# Patient Record
Sex: MALE | Race: BLACK OR AFRICAN AMERICAN | NOT HISPANIC OR LATINO | Employment: UNEMPLOYED | URBAN - METROPOLITAN AREA
[De-identification: names, ages, dates, MRNs, and addresses within clinical notes are randomized per-mention and may not be internally consistent; named-entity substitution may affect disease eponyms.]

---

## 2017-04-22 ENCOUNTER — HOSPITAL ENCOUNTER (EMERGENCY)
Facility: HOSPITAL | Age: 57
Discharge: HOME | End: 2017-04-22
Attending: EMERGENCY MEDICINE

## 2017-04-22 VITALS
TEMPERATURE: 98 F | BODY MASS INDEX: 25.84 KG/M2 | HEIGHT: 73 IN | OXYGEN SATURATION: 100 % | WEIGHT: 195 LBS | RESPIRATION RATE: 15 BRPM | HEART RATE: 76 BPM | DIASTOLIC BLOOD PRESSURE: 90 MMHG | SYSTOLIC BLOOD PRESSURE: 162 MMHG

## 2017-04-22 DIAGNOSIS — R07.89 ATYPICAL CHEST PAIN: Primary | ICD-10-CM

## 2017-04-22 DIAGNOSIS — I10 ESSENTIAL HYPERTENSION: ICD-10-CM

## 2017-04-22 PROCEDURE — 96374 THER/PROPH/DIAG INJ IV PUSH: CPT

## 2017-04-22 PROCEDURE — 93010 ELECTROCARDIOGRAM REPORT: CPT | Mod: ,,, | Performed by: INTERNAL MEDICINE

## 2017-04-22 PROCEDURE — 25000003 PHARM REV CODE 250

## 2017-04-22 PROCEDURE — 80053 COMPREHEN METABOLIC PANEL: CPT

## 2017-04-22 PROCEDURE — 84484 ASSAY OF TROPONIN QUANT: CPT

## 2017-04-22 PROCEDURE — 63600175 PHARM REV CODE 636 W HCPCS: Performed by: EMERGENCY MEDICINE

## 2017-04-22 PROCEDURE — 93005 ELECTROCARDIOGRAM TRACING: CPT

## 2017-04-22 PROCEDURE — 85025 COMPLETE CBC W/AUTO DIFF WBC: CPT

## 2017-04-22 PROCEDURE — 99284 EMERGENCY DEPT VISIT MOD MDM: CPT | Mod: 25

## 2017-04-22 PROCEDURE — 83735 ASSAY OF MAGNESIUM: CPT

## 2017-04-22 RX ORDER — HYDRALAZINE HYDROCHLORIDE 20 MG/ML
10 INJECTION INTRAMUSCULAR; INTRAVENOUS ONCE
Status: COMPLETED | OUTPATIENT
Start: 2017-04-22 | End: 2017-04-22

## 2017-04-22 RX ORDER — ASPIRIN 325 MG
TABLET ORAL
Status: COMPLETED
Start: 2017-04-22 | End: 2017-04-22

## 2017-04-22 RX ORDER — ASPIRIN 325 MG
325 TABLET ORAL
Status: COMPLETED | OUTPATIENT
Start: 2017-04-22 | End: 2017-04-22

## 2017-04-22 RX ORDER — HYDRALAZINE HYDROCHLORIDE 20 MG/ML
INJECTION INTRAMUSCULAR; INTRAVENOUS
Status: DISCONTINUED
Start: 2017-04-22 | End: 2017-04-22 | Stop reason: HOSPADM

## 2017-04-22 RX ADMIN — Medication: at 05:04

## 2017-04-22 RX ADMIN — ASPIRIN 325 MG ORAL TABLET: 325 PILL ORAL at 05:04

## 2017-04-22 RX ADMIN — HYDRALAZINE HYDROCHLORIDE 10 MG: 20 INJECTION INTRAMUSCULAR; INTRAVENOUS at 05:04

## 2017-04-22 NOTE — ED AVS SNAPSHOT
[facility]  [address]  [place] [address]               Devin Farley   2017  4:59 AM   ED    Description:  Male : 1960   Department:  [facility]           Your Care was Coordinated By:     Provider Role From To    Nata Flannery MD Attending Provider 17 0511 --      Diagnoses this Visit        Comments    Atypical chest pain    -  Primary     Essential hypertension           ED Disposition     None           To Do List           Follow-up Information     Follow up with Sravan Porter MD In 2 days.    Specialty:  Cardiology    Contact information:    [address] [facility] [address] [place] [address]  [phone]          Follow up with Johanna Nance MD In 2 days.    Specialty:  Cardiology    Contact information:    [address]  [place] [address]  [phone]        [facility]     [facility] Care Line - 24/ Assistance  Unless otherwise directed by your provider, please contact [facility], our nurse care line that is available for / assistance.     Registered nurses in the [facility] provide: appointment scheduling, clinical advisement, health education, and other advisory services.  Call: [phone] (toll free)               Medications           These medications were administered today        Dose Freq    hydrALAZINE (APRESOLINE) 20 mg/mL injection      Notes to Pharmacy: Created by cabinet override    aspirin 325 MG tablet      Notes to Pharmacy: Created by cabinet override    hydrALAZINE injection 10 mg 10 mg Once    Sig: Inject 0.5 mLs (10 mg total) into the vein once.    Class: Normal    Route: Intravenous    aspirin tablet 325 mg 325 mg ED 1 Time    Sig: Take 1 tablet (325 mg total) by mouth ED 1 Time.    Class: Normal    Route: Oral           Verify that the below list of medications is an accurate representation of the medications you are currently taking.  If none  "reported, the list may be blank. If incorrect, please contact your healthcare provider. Carry this list with you in case of emergency.           Current Medications            Clinical Reference Information           Your Vitals Were     BP Pulse Temp Resp Height Weight    155/82 75 98.4 °F (36.9 °C) (Oral) 16 6' 1" (1.854 m) 88.5 kg (195 lb)    SpO2 BMI             100% 25.73 kg/m2         Allergies as of 4/22/2017     No Known Allergies      Immunizations Administered on Date of Encounter - 4/22/2017     None      ED Micro, Lab, POCT     Start Ordered       Status Ordering Provider    04/22/17 0530 04/22/17 0529    STAT,   Status:  Canceled      Canceled     04/22/17 0530 04/22/17 0529    STAT,   Status:  Canceled      Canceled     04/22/17 0530 04/22/17 0529  Troponin I  Now then every 3 hours     Comments:  PLEASE REVIEW ORDER START TIME BEFORE MARKING SPECIMEN COLLECTED.   Start Status   04/22/17 0530 Canceled   04/22/17 0830 Acknowledged       Acknowledged       ED Imaging Orders     Start Ordered       Status Ordering Provider    04/22/17 0512 04/22/17 0511  X-Ray Chest 1 View  1 time imaging      Final result         Discharge Instructions         1. Return immediately for recurrent symptoms or if you decide you want the additional blood work or to be admitted  2. Take an enteric coated aspirin 81 mg every day with food  3. Followup with a cardiologist on Monday     Uncertain Causes of Chest Pain    Chest pain can happen for a number of reasons. Sometimes the cause can't be determined. If your condition does not seem serious, and your pain does not appear to be coming from your heart, your healthcare provider may recommend watching it closely. Sometimes the signs of a serious problem take more time to appear. Many problems not related to your heart can cause chest pain.These include:  · Musculoskeletal. Costochondritis, an inflammation of the tissues around the ribs that can occur from trauma or overuse " injuries  · Respiratory. Pneumonia, pneumothorax, or pneumonitis (inflammation of the lining of the chest and lungs)  · Gastrointestinal. Esophageal reflux, heartburn, or gallbladder disease  · Anxiety and panic disorders  · Nerve compression and neuritis  · Miscellaneous problems such as aortic aneurysm or pulmonary embolism (a blood clot in the lungs)  Home care  After your visit, follow these recommendations:  · Rest today and avoid strenuous activity.  · Take any prescribed medicine as directed.  · Be aware of any recurrent chest pain and notice any changes  Follow-up care  Follow up with your healthcare provider if you do not start to feel better within 24 hours, or as advised.  Call 911  Call 911 if any of these occur:  · A change in the type of pain: if it feels different, becomes more severe, lasts longer, or begins to spread into your shoulder, arm, neck, jaw or back  · Shortness of breath or increased pain with breathing  · Weakness, dizziness, or fainting  · Rapid heart beat  · Crushing sensation in your chest  When to seek medical advice  Call your healthcare provider right away if any of the following occur:  · Cough with dark colored sputum (phlegm) or blood  · Fever of 100.4ºF (38ºC) or higher, or as directed by your healthcare provider  · Swelling, pain or redness in one leg  · Shortness of breath  Date Last Reviewed: 12/30/2015  © 2882-7591 [employer] [address] [place] [address]. All rights reserved. This information is not intended as a substitute for professional medical care. Always follow your healthcare professional's instructions.          [facility] Sign-Up     Activating your [facility] account is as easy as 1-2-3!     1) Visit [email or web address], select Sign Up Now, enter this activation code and your date of birth, then select Next.  [email or web address]  Expires: 6/6/2017  8:22 AM      2) Create a username and password to use when you visit [facility] in the future and  select a security question in case you lose your password and select Next.    3) Enter your e-mail address and click Sign Up!    Additional Information  If you have questions, please e-mail [email or web address] or call [phone] to talk to our [facility] staff. Remember, [facility] is NOT to be used for urgent needs. For medical emergencies, dial 911.         Smoking Cessation     If you would like to quit smoking:   You may be eligible for free services if you are a [place] resident and started smoking cigarettes before September 1, 1988.  Call the Smoking Cessation Trust (SCT) toll free at (391) 482-1546 or (340) 448-9271.   Call 8-693-QUIT-NOW if you do not meet the above criteria.   Contact us via email: [email or web address]   View our website for more information: [email or web address]         [facility] complies with applicable Federal civil rights laws and does not discriminate on the basis of race, color, national origin, age, disability, or sex.        Language Assistance Services     ATTENTION: Language assistance services are available, free of charge. Please call [phone].      ATENCIÓN: Si habla español, tiene a panchal disposición servicios gratuitos de asistencia lingüística. Llame al [phone].     CHÚ Ý: N?u b?n nói Ti?ng Vi?t, có các d?ch v? h? tr? ngôn ng? mi?n phí dành cho b?n. G?i s? [phone].

## 2017-04-22 NOTE — DISCHARGE INSTRUCTIONS
1. Return immediately for recurrent symptoms or if you decide you want the additional blood work or to be admitted  2. Take an enteric coated aspirin 81 mg every day with food  3. Followup with a cardiologist on Monday     Uncertain Causes of Chest Pain    Chest pain can happen for a number of reasons. Sometimes the cause can't be determined. If your condition does not seem serious, and your pain does not appear to be coming from your heart, your healthcare provider may recommend watching it closely. Sometimes the signs of a serious problem take more time to appear. Many problems not related to your heart can cause chest pain.These include:  · Musculoskeletal. Costochondritis, an inflammation of the tissues around the ribs that can occur from trauma or overuse injuries  · Respiratory. Pneumonia, pneumothorax, or pneumonitis (inflammation of the lining of the chest and lungs)  · Gastrointestinal. Esophageal reflux, heartburn, or gallbladder disease  · Anxiety and panic disorders  · Nerve compression and neuritis  · Miscellaneous problems such as aortic aneurysm or pulmonary embolism (a blood clot in the lungs)  Home care  After your visit, follow these recommendations:  · Rest today and avoid strenuous activity.  · Take any prescribed medicine as directed.  · Be aware of any recurrent chest pain and notice any changes  Follow-up care  Follow up with your healthcare provider if you do not start to feel better within 24 hours, or as advised.  Call 911  Call 911 if any of these occur:  · A change in the type of pain: if it feels different, becomes more severe, lasts longer, or begins to spread into your shoulder, arm, neck, jaw or back  · Shortness of breath or increased pain with breathing  · Weakness, dizziness, or fainting  · Rapid heart beat  · Crushing sensation in your chest  When to seek medical advice  Call your healthcare provider right away if any of the following occur:  · Cough with dark colored sputum  (phlegm) or blood  · Fever of 100.4ºF (38ºC) or higher, or as directed by your healthcare provider  · Swelling, pain or redness in one leg  · Shortness of breath  Date Last Reviewed: 12/30/2015  © 6157-4525 [employer]. [address] [place] [address]. All rights reserved. This information is not intended as a substitute for professional medical care. Always follow your healthcare professional's instructions.

## 2017-04-22 NOTE — ED NOTES
Patient has verified the spelling of their name and  on armband  LOC: The patient is awake, alert, and aware of environment with an appropriate affect, the patient is oriented x 4 and speaking appropriately.   APPEARANCE: Patient resting comfortably and in no acute distress, patient is clean and well groomed, patient's clothing is properly fastened.   SKIN: The skin is warm and dry, color consistent with ethnicity, patient has normal skin turgor and moist mucus membranes, skin intact, no breakdown or bruising noted.   : denies any problems urinating  MUSCULOSKELETAL: Patient moving all extremities spontaneously, no obvious swelling or deformities noted.   RESPIRATORY: Airway is open and patent, respirations are spontaneous, patient has a normal effort and rate, no accessory muscle use noted, bilateral breath sounds clear, denies SOB   ABDOMEN: Soft and non tender to palpation, no distention noted, normoactive bowel sounds present in all four quadrants.   CARDIAC: Normal rate and rhythm, no peripheral edema noted, less then 3 second capillary refill, complaints of  Chest tightness since Thursday, denies any nausea, vomiting or radiation of pain, no visiual changes

## 2017-04-22 NOTE — PROVIDER PROGRESS NOTES - EMERGENCY DEPT.
Encounter Date: 4/22/2017    ED Physician Progress Notes        Physician Note:   The patient is symptom free and his 1st set of cardiac enzymes is negative.  Discussion regarding possibility of cardiac chest pain at bedside including risks of missed MI including death, irreversible heart damage. Atypical symptoms but +risk factors warrants further risk stratification with stress echo which should be completed as soon as possible. Patient is adamant that he does not want to stay for 2nd set of cardiac enzymes or admission and reports he will f/u with a cardiologist on Monday.  Patient is competent and has insight.  He has had several ED visits for same symptoms with reassuring workups each time.

## 2017-04-22 NOTE — ED TRIAGE NOTES
complaints of  Chest tightness since Thursday, denies any nausea, vomiting or radiation of pain, no visiual changes

## 2017-04-22 NOTE — ED NOTES
Report received. Care assumed. Pt resting comfortably. Respirations even and unlabored. Pt VSS. Will continue to monitor.

## 2017-05-13 NOTE — ED PROVIDER NOTES
Encounter Date: 4/22/2017       History   No chief complaint on file.    Review of patient's allergies indicates:  No Known Allergies  HPI Comments: CHIEF COMPLAINT: Chest pain    HISTORY OF PRESENT ILLNESS: This 56-year-old male presents to the emergency Department today with complaint of chest pain.  He has a history of hypertension.  He's had any known coronary artery disease.  He has never been seen by a cardiologist.  He describes pain as a pressure-like pain that started this morning.  He had some slight shortness of breath but no diaphoresis and no vomiting no nausea.  No palpitations.  No recent long travel.  No hemoptysis. No leg swelling.    REVIEW OF SYSTEMS:  Constitutional: No fever, no chills.  Eyes: No discharge. No pain.  HENT: No nasal drainage. No ear ache. No sore throat.  Cardiovascular: See above.    Respiratory: No cough.  Gastrointestinal: No abdominal pain, no vomiting. No diarrhea.  Genitourinary: No hematuria, dysuria, urgency.  Musculoskeletal: No back pain.  Skin: No rashes, no lesions.  Neurological: No headache, no focal weakness.    ALLERGIES reviewed  Family history reviewed  Home medications reviewed  Problem list reviewed        The history is provided by the patient.     No past medical history on file.  No past surgical history on file.  No family history on file.  Social History   Substance Use Topics    Smoking status: Not on file    Smokeless tobacco: Not on file    Alcohol use Not on file     Review of Systems   All other systems reviewed and are negative.      Physical Exam   Initial Vitals   BP Pulse Resp Temp SpO2   04/22/17 0520 04/22/17 0520 04/22/17 0520 04/22/17 0759 04/22/17 0520   164/100 61 16 98.4 °F (36.9 °C) 99 %     Physical Exam    Nursing note and vitals reviewed.  Constitutional: He appears well-developed and well-nourished. He is not diaphoretic. No distress.   HENT:   Head: Normocephalic and atraumatic.   Nose: Nose normal.   Mouth/Throat: Oropharynx is  clear and moist.   Eyes: Conjunctivae and EOM are normal. Pupils are equal, round, and reactive to light. No scleral icterus.   Neck: Normal range of motion. Neck supple. No JVD present.   Cardiovascular: Normal rate, regular rhythm and normal heart sounds. Exam reveals no gallop and no friction rub.    No murmur heard.  Pulmonary/Chest: Breath sounds normal. No stridor. No respiratory distress. He has no wheezes. He exhibits no tenderness.   Abdominal: Soft. Bowel sounds are normal. He exhibits no distension and no mass. There is no tenderness. There is no rebound and no guarding.   Musculoskeletal: Normal range of motion. He exhibits no edema or tenderness.   Lymphadenopathy:     He has no cervical adenopathy.   Neurological: He is alert and oriented to person, place, and time. He has normal strength. No cranial nerve deficit.   Skin: Skin is warm and dry. No rash noted. No pallor.   Psychiatric: He has a normal mood and affect. Thought content normal.         ED Course   Procedures  Labs Reviewed - No data to display  EKG Readings: (Independently Interpreted)   Initial Reading: No STEMI.   Vent. Rate : 068 BPM     Atrial Rate : 068 BPM     P-R Int : 146 ms          QRS Dur : 088 ms      QT Int : 400 ms       P-R-T Axes : 052 062 056 degrees     QTc Int : 425 ms    Normal sinus rhythm  Septal infarct ,age undetermined  Abnormal ECG  No previous ECGs available       ECG Results         EKG 12-lead (Final result) Result time:  04/24/17 13:24:13    Final result by Interface, Lab In [facility] (04/24/17 13:24:13)    Narrative:    Test Reason : r07.9    Vent. Rate : 068 BPM     Atrial Rate : 068 BPM     P-R Int : 146 ms          QRS Dur : 088 ms      QT Int : 400 ms       P-R-T Axes : 052 062 056 degrees     QTc Int : 425 ms    Normal sinus rhythm  Septal infarct ,age undetermined  Abnormal ECG  No previous ECGs available  Confirmed by Amrit Vega MD ([id number]) on 4/24/2017 1:24:05 PM    Referred By: [email or web address]   SELF            Confirmed By:Amrit Vega MD            EKG 12-LEAD (Final result) Result time:  05/01/17 08:56:10        X-Rays: Other Radiology Reports: Images reviewed by myself, read by radiology,     Imaging Results         X-Ray Chest 1 View (Final result) Result time:  04/22/17 05:26:07    Final result by Sandy Romano MD (04/22/17 05:26:07)    Impression:        No radiographic evidence of acute intrathoracic process.      Electronically signed by: SANDY ROMANO  Date:     04/22/17  Time:    05:26     Narrative:    Comparison:None    Technique: Single AP portable chest radiograph.    Findings:   Cardiac monitoring leads overlie the chest. The cardiomediastinal silhouette appears within normal limits. The trachea is midline. The lungs appear well expanded without evidence of focal airspace consolidation. There is no evidence of large pleural effusion or pneumothorax. Visualized osseous structures appear intact.              Medical Decision Making:   Initial Assessment:   Patient presents to the emergency department pain, we will obtain labs including troponin, CXR and EKG, given symptom control and reassess.    Differential Diagnosis:   ACS, aortic dissection, myocarditis, pericarditis, pulmonary embolism, pneumothorax, Boerhaave syndrome, biliary colic, GERD, peptic ulcer disease, pneumonia, pleurisy, musculoskeletal pain, costochondritis, thoracic radiculopathy, anxiety, trauma, fracture   Independently Interpreted Test(s):   I have ordered and independently interpreted EKG Reading(s) - see prior notes  Clinical Tests:   Lab Tests: Ordered  Radiological Study: Ordered  ED Management:  Patient care will be transferred over to Dr. Shelbie Burnette at shift change.  Awaiting results of labs and x-ray.  She will make the final disposition appropriately.                   ED Course    patient Transferred to Dr. Shelbie Burnette at shift change.  Awaiting results of labs.  She will make the final disposition appropriately.  Clinical  Impression:   The primary encounter diagnosis was Atypical chest pain. A diagnosis of Essential hypertension was also pertinent to this visit.          Nata Flannery MD  05/13/17 0625

## 2021-03-13 ENCOUNTER — IMMUNIZATION (OUTPATIENT)
Dept: FAMILY MEDICINE | Facility: CLINIC | Age: 61
End: 2021-03-13
Payer: OTHER GOVERNMENT

## 2021-03-13 DIAGNOSIS — Z23 NEED FOR VACCINATION: Primary | ICD-10-CM

## 2021-03-13 PROCEDURE — 91303 COVID-19,VECTOR-NR,RS-AD26,PF,0.5 ML DOSE VACCINE (JANSSEN): CPT | Mod: ,,, | Performed by: FAMILY MEDICINE

## 2021-03-13 PROCEDURE — 0031A COVID-19,VECTOR-NR,RS-AD26,PF,0.5 ML DOSE VACCINE (JANSSEN): ICD-10-PCS | Mod: CV19,,, | Performed by: FAMILY MEDICINE

## 2021-03-13 PROCEDURE — 0031A COVID-19,VECTOR-NR,RS-AD26,PF,0.5 ML DOSE VACCINE (JANSSEN): CPT | Mod: CV19,,, | Performed by: FAMILY MEDICINE

## 2021-03-13 PROCEDURE — 91303 COVID-19,VECTOR-NR,RS-AD26,PF,0.5 ML DOSE VACCINE (JANSSEN): ICD-10-PCS | Mod: ,,, | Performed by: FAMILY MEDICINE
